# Patient Record
(demographics unavailable — no encounter records)

---

## 2024-11-15 NOTE — PLAN
[FreeTextEntry1] : 31 year old  at 31w2d presenting as JIA. -EFW 48th%, NIRMAL wnl,  on sono, see report -Missing PNLs collected: RPR, HIV, Hep B/C, MMR, urine culture -Continue PNV -s/p Tdap today. Already got flu vax through work -Routine PNC reviewed including nutrition, exercise and safe medications in pregnancy -Oriented to practice, including cross coverage on L&D and resident participation -PHQ-2: 0 -F/u 2 weeks  J Goldberg MD

## 2024-11-15 NOTE — HISTORY OF PRESENT ILLNESS
[FreeTextEntry1] : 32yo  at 31w2d by LMP c/w 1st tri sono presents as JIA. Prenatal records including labs and sonos reviewed and scanned into chart. Thus far, no pregnancy complications. 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 She has bilateral foot swelling and pelvic pressure. No ctx, lof, vb, or dfm. [Patient reported PAP Smear was normal] : Patient reported PAP Smear was normal [PapSmeardate] : 10/23

## 2024-11-15 NOTE — PHYSICAL EXAM
[Appropriately responsive] : appropriately responsive [Alert] : alert [No Acute Distress] : no acute distress [Soft] : soft [Non-tender] : non-tender [Non-distended] : non-distended [Oriented x3] : oriented x3 [FreeTextEntry7] : gravid